# Patient Record
Sex: MALE | Race: WHITE | Employment: FULL TIME | URBAN - METROPOLITAN AREA
[De-identification: names, ages, dates, MRNs, and addresses within clinical notes are randomized per-mention and may not be internally consistent; named-entity substitution may affect disease eponyms.]

---

## 2020-01-14 ENCOUNTER — HOSPITAL ENCOUNTER (OUTPATIENT)
Age: 50
Discharge: HOME | End: 2020-01-14
Payer: COMMERCIAL

## 2020-01-14 PROBLEM — K40.90 LEFT INGUINAL HERNIA: Chronic | Status: ACTIVE | Noted: 2020-01-14

## 2020-01-14 PROCEDURE — 99201 HC NEW PT, OUTPT VISIT LEVEL 1: CPT

## 2020-01-14 ASSESSMENT — ENCOUNTER SYMPTOMS
DIARRHEA: 0
NAUSEA: 0
CONSTIPATION: 0
RHINORRHEA: 0
COUGH: 0
ABDOMINAL PAIN: 0
SORE THROAT: 0
SHORTNESS OF BREATH: 0
WHEEZING: 0

## 2020-01-14 NOTE — H&P
hernia   The patient has watched our educational video regarding the anterior mesh approach for repair of inguinal hernia. I discussed repair with him and indications as well as risks as well as the use of prosthetic mesh. The patient has a good understanding and is agreeable to proceed. Consent form was discussed and signed. Active Hospital Problems    Diagnosis Date Noted    Left inguinal hernia [K40.90] 01/14/2020       PLAN:  1. Repair left inguinal hernia. Outpatient. Local anesthesia with IV sedation.     .    Electronically signed by Rasheeda Murry MD on 1/14/2020 at 2:45 PM     Copy sent to Dr. Tarik Evans DO

## 2020-01-15 ENCOUNTER — HOSPITAL ENCOUNTER (OUTPATIENT)
Age: 50
Setting detail: OUTPATIENT SURGERY
Discharge: HOME | End: 2020-01-15
Attending: SURGERY | Admitting: SURGERY
Payer: COMMERCIAL

## 2020-01-15 VITALS
BODY MASS INDEX: 30.22 KG/M2 | HEART RATE: 62 BPM | TEMPERATURE: 97.7 F | RESPIRATION RATE: 17 BRPM | WEIGHT: 188 LBS | SYSTOLIC BLOOD PRESSURE: 117 MMHG | OXYGEN SATURATION: 96 % | HEIGHT: 66 IN | DIASTOLIC BLOOD PRESSURE: 93 MMHG

## 2020-01-15 PROBLEM — K40.90 LEFT INGUINAL HERNIA: Chronic | Status: RESOLVED | Noted: 2020-01-14 | Resolved: 2020-01-15

## 2020-01-15 PROCEDURE — 99153 MOD SED SAME PHYS/QHP EA: CPT | Performed by: SURGERY

## 2020-01-15 PROCEDURE — 2500000003 HC RX 250 WO HCPCS: Performed by: SURGERY

## 2020-01-15 PROCEDURE — 2709999900 HC NON-CHARGEABLE SUPPLY: Performed by: SURGERY

## 2020-01-15 PROCEDURE — 3600000002 HC SURGERY LEVEL 2 BASE: Performed by: SURGERY

## 2020-01-15 PROCEDURE — 7100000010 HC PHASE II RECOVERY - FIRST 15 MIN: Performed by: SURGERY

## 2020-01-15 PROCEDURE — 3600000012 HC SURGERY LEVEL 2 ADDTL 15MIN: Performed by: SURGERY

## 2020-01-15 PROCEDURE — 7100000011 HC PHASE II RECOVERY - ADDTL 15 MIN: Performed by: SURGERY

## 2020-01-15 PROCEDURE — 6360000002 HC RX W HCPCS: Performed by: SURGERY

## 2020-01-15 PROCEDURE — C1781 MESH (IMPLANTABLE): HCPCS | Performed by: SURGERY

## 2020-01-15 PROCEDURE — 99152 MOD SED SAME PHYS/QHP 5/>YRS: CPT | Performed by: SURGERY

## 2020-01-15 PROCEDURE — 2580000003 HC RX 258: Performed by: SURGERY

## 2020-01-15 DEVICE — MESH HERN W4XL6IN POLYPR MID WT MFIL RECTANG OVL FLAT SHT: Type: IMPLANTABLE DEVICE | Site: GROIN | Status: FUNCTIONAL

## 2020-01-15 RX ORDER — CLINDAMYCIN PHOSPHATE 900 MG/50ML
INJECTION INTRAVENOUS CONTINUOUS PRN
Status: COMPLETED | OUTPATIENT
Start: 2020-01-15 | End: 2020-01-15

## 2020-01-15 RX ORDER — MIDAZOLAM HYDROCHLORIDE 1 MG/ML
INJECTION INTRAMUSCULAR; INTRAVENOUS PRN
Status: DISCONTINUED | OUTPATIENT
Start: 2020-01-15 | End: 2020-01-15 | Stop reason: ALTCHOICE

## 2020-01-15 RX ORDER — CLINDAMYCIN PHOSPHATE 900 MG/50ML
900 INJECTION INTRAVENOUS EVERY 8 HOURS
Status: DISCONTINUED | OUTPATIENT
Start: 2020-01-15 | End: 2020-01-15

## 2020-01-15 RX ORDER — OXYCODONE HYDROCHLORIDE AND ACETAMINOPHEN 5; 325 MG/1; MG/1
1 TABLET ORAL EVERY 6 HOURS PRN
Qty: 10 TABLET | Refills: 0 | Status: SHIPPED | OUTPATIENT
Start: 2020-01-15 | End: 2020-01-20

## 2020-01-15 RX ORDER — CLINDAMYCIN PHOSPHATE 900 MG/50ML
900 INJECTION INTRAVENOUS ONCE
Status: DISCONTINUED | OUTPATIENT
Start: 2020-01-15 | End: 2020-01-15 | Stop reason: HOSPADM

## 2020-01-15 RX ORDER — SODIUM CHLORIDE, SODIUM LACTATE, POTASSIUM CHLORIDE, CALCIUM CHLORIDE 600; 310; 30; 20 MG/100ML; MG/100ML; MG/100ML; MG/100ML
INJECTION, SOLUTION INTRAVENOUS CONTINUOUS
Status: DISCONTINUED | OUTPATIENT
Start: 2020-01-15 | End: 2020-01-15 | Stop reason: HOSPADM

## 2020-01-15 RX ORDER — FENTANYL CITRATE 50 UG/ML
INJECTION, SOLUTION INTRAMUSCULAR; INTRAVENOUS PRN
Status: DISCONTINUED | OUTPATIENT
Start: 2020-01-15 | End: 2020-01-15 | Stop reason: ALTCHOICE

## 2020-01-15 RX ORDER — OXYCODONE HYDROCHLORIDE AND ACETAMINOPHEN 5; 325 MG/1; MG/1
1 TABLET ORAL ONCE
Status: DISCONTINUED | OUTPATIENT
Start: 2020-01-15 | End: 2020-01-15 | Stop reason: HOSPADM

## 2020-01-15 RX ADMIN — SODIUM CHLORIDE, POTASSIUM CHLORIDE, SODIUM LACTATE AND CALCIUM CHLORIDE: 600; 310; 30; 20 INJECTION, SOLUTION INTRAVENOUS at 09:18

## 2020-01-15 SDOH — HEALTH STABILITY: MENTAL HEALTH: HOW OFTEN DO YOU HAVE A DRINK CONTAINING ALCOHOL?: NEVER

## 2020-01-15 ASSESSMENT — PAIN - FUNCTIONAL ASSESSMENT: PAIN_FUNCTIONAL_ASSESSMENT: 0-10

## 2020-01-15 ASSESSMENT — PAIN SCALES - GENERAL
PAINLEVEL_OUTOF10: 0

## 2020-01-15 NOTE — INTERVAL H&P NOTE
H&P Update    Patient's History and Physical from January 14, 2020 was reviewed. Patient examined. There has been no change.     Electronically signed by Odalys Anderson MD on 1/15/20 at 9:39 AM

## 2020-01-15 NOTE — OP NOTE
the external ring and could not be used prior to mobilization of the cord. .  By sharp dissection, the cord was freed from the confines of the canal and elevated at the level of the pubic tubercle and the elevation taken up to the internal ring. The cremasteric fascia was opened and it was then possible to manually reduce the hernia contents. .  Dissection of the cord revealed the hernia sac on the anteromedial surface of the cord and some accompanying preperitoneal fat. . The sac was dissected sharply free from the vas and vessels and inverted back into the preperitoneal space without ligation or division. The surrounding preperitoneal fat was excised. The internal ring was evaluated and was found to admit 2 fingers very tightly. It was estimated at 2.5 cm in size. .  The floor of the canal somewhat foreshortened but intact. A preperitoneal mesh repair was performed. Preparatory to this, the cord was skeletonized, dividing the cremasteric fascia with the cautery and the external spermatic vessels and the accompanying genital branch of the genitofemoral nerve between hemostats and ties with 3-0 Vicryl. The floor of the canal was opened from the inferior epigastric vessels down to the pubic tubercle sharply and the entire myopectineal orifice was then dissected working through the floor of the canal using a combination of blunt and sharp dissection and judicious use of the cautery for hemostasis. The iliohypogastric nerve was  identified, was located far medial on the surface of the internal oblique and was easily avoided. The repair was then performed by placing a and by 15 cm piece of ProLite  Mesh into the preperitoneal space. This was anchored near the pubic tubercle with a mattress suture of 2-0 Prolene. Laterally, a slit was placed in this mesh and the 2 tails were passed around the cord and reapproximated with 2-0 PDS.  This lateral portion of the mesh then was passed over the inferior epigastric vessels and back into the preperitoneal space through the internal ring. The mesh and the wound were then irrigated with saline solution containing gentamicin. The floor of the canal was  closed over the mesh after the manner of a Shouldice repair. Starting with the 2-0 Prolene previously placed a running imbricating layer of transversalis fascia medially  was sutured to the iliopubic tract laterally, coming out to the internal ring until a tight closure had been performed, then reversing the suture and suturing the free edge that was thus created to the shelving portion of the inguinal ligament, running down to the pubic tubercle and tying. A 2nd suture of 2-0 Prolene was then started up near the internal ring, suturing the internal oblique to the shelving portion of the inguinal ligament, running down to the pubic tubercle, reversing the suture and coming back up to the internal ring and tying. The cord and ilioinguinal nerve were placed back into the canal and then after further irrigations with the saline and gentamicin solution, the external oblique was closed over the cord with running suture of 2-0 PDS. Malachi's fascia was closed with interrupted sutures of 3-0 Monocryl and the skin was closed with a combination of interrupted subcuticular sutures of 4-0 Monocryl and dermal adhesive. Patient tolerated the procedure well and was transferred to the postoperative care area stable and in good condition with plans to be discharged to home later the same day. The patient did receive appropriate prophylactic antibiotics before the operation and does not require any after the operation. The patient was treated for VTE prophylaxis with IPC cuffs and does not require VTE prophylaxis after the operation.     Electronically signed by Jil Roy MD on 1/15/2020 at 11:37 AM

## (undated) DEVICE — BLADE CLIPPER GEN PURP NS

## (undated) DEVICE — GLOVE SURG SZ 7 CRM LTX FREE POLYISOPRENE POLYMER BEAD ANTI

## (undated) DEVICE — GLOVE SURG SZ 65 CRM LTX FREE POLYISOPRENE POLYMER BEAD ANTI

## (undated) DEVICE — SKIN AFFIX SURG ADHESIVE 72/CS 0.55ML: Brand: MEDLINE

## (undated) DEVICE — GOWN,SIRUS,NON REINFRCD,LARGE,SET IN SL: Brand: MEDLINE

## (undated) DEVICE — YANKAUER,FLEXIBLE HANDLE,REGLR CAPACITY: Brand: MEDLINE INDUSTRIES, INC.

## (undated) DEVICE — CLIPVAC PRESURG HAIR REMOVAL

## (undated) DEVICE — TOWEL,OR,DSP,ST,BLUE,DLX,XR,4/PK,20PK/CS: Brand: MEDLINE

## (undated) DEVICE — Z DISCONTINUED USE 2624853 GLOVE SURG SZ 75 L12IN THK91MIL BRN LTX FREE

## (undated) DEVICE — SKIN PREP TRAY W/CHG: Brand: MEDLINE INDUSTRIES, INC.